# Patient Record
Sex: FEMALE | Race: ASIAN | NOT HISPANIC OR LATINO
[De-identification: names, ages, dates, MRNs, and addresses within clinical notes are randomized per-mention and may not be internally consistent; named-entity substitution may affect disease eponyms.]

---
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
## 2024-02-26 ENCOUNTER — PATIENT MESSAGE (OUTPATIENT)
Dept: INTERNAL MEDICINE CLINIC | Facility: CLINIC | Age: 19
End: 2024-02-26

## 2024-02-26 ENCOUNTER — APPOINTMENT (OUTPATIENT)
Dept: URGENT CARE | Age: 19
End: 2024-02-26

## 2024-02-26 NOTE — TELEPHONE ENCOUNTER
Mom is calling back- Kwabena is home from school until Saturday,  She would like for her to be seen.   No appointments available..  Apt made with Eleni..    Please Triage if needed..

## 2024-02-27 ENCOUNTER — OFFICE VISIT (OUTPATIENT)
Dept: INTERNAL MEDICINE CLINIC | Facility: CLINIC | Age: 19
End: 2024-02-27
Payer: COMMERCIAL

## 2024-02-27 VITALS
SYSTOLIC BLOOD PRESSURE: 100 MMHG | RESPIRATION RATE: 14 BRPM | TEMPERATURE: 98 F | HEART RATE: 88 BPM | BODY MASS INDEX: 19.07 KG/M2 | WEIGHT: 103.63 LBS | OXYGEN SATURATION: 99 % | DIASTOLIC BLOOD PRESSURE: 66 MMHG | HEIGHT: 62 IN

## 2024-02-27 DIAGNOSIS — B37.31 VAGINAL YEAST INFECTION: Primary | ICD-10-CM

## 2024-02-27 DIAGNOSIS — Z23 NEED FOR HPV VACCINATION: ICD-10-CM

## 2024-02-27 PROCEDURE — 3008F BODY MASS INDEX DOCD: CPT | Performed by: FAMILY MEDICINE

## 2024-02-27 PROCEDURE — 3078F DIAST BP <80 MM HG: CPT | Performed by: FAMILY MEDICINE

## 2024-02-27 PROCEDURE — 99213 OFFICE O/P EST LOW 20 MIN: CPT | Performed by: FAMILY MEDICINE

## 2024-02-27 PROCEDURE — 90651 9VHPV VACCINE 2/3 DOSE IM: CPT | Performed by: FAMILY MEDICINE

## 2024-02-27 PROCEDURE — 90471 IMMUNIZATION ADMIN: CPT | Performed by: FAMILY MEDICINE

## 2024-02-27 PROCEDURE — 81514 NFCT DS BV&VAGINITIS DNA ALG: CPT | Performed by: FAMILY MEDICINE

## 2024-02-27 PROCEDURE — 3074F SYST BP LT 130 MM HG: CPT | Performed by: FAMILY MEDICINE

## 2024-02-27 RX ORDER — FLUCONAZOLE 150 MG/1
TABLET ORAL
Qty: 2 TABLET | Refills: 1 | Status: SHIPPED | OUTPATIENT
Start: 2024-02-27

## 2024-02-27 RX ORDER — NYSTATIN 100000 U/G
1 CREAM TOPICAL 3 TIMES DAILY PRN
Qty: 30 G | Refills: 0 | Status: SHIPPED | OUTPATIENT
Start: 2024-02-27

## 2024-02-27 NOTE — PROGRESS NOTES
HPI:   Kwabena Burnett is a 18 year old female   Patient presents with c/o vaginal discharge for the past 14 days. Symptoms started after her last menses. Pt did use a different feminine pad, and a different toilet paper during her menses. LMP  2/10/24, She denies any pelvic pain or dyspareunia. Pt complains of itching.Patient reports the vaginal discharge is white and has no odor. No urinary frequency or urgency. no burning with urination. Exposure to known STD: none, Sexual Hx:  has never been active. Pt is a virgin.OTC treatment includes: coconut oil.    Current Outpatient Medications   Medication Sig Dispense Refill    fluconazole (DIFLUCAN) 150 MG Oral Tab Take one tablet today and then repeat in one week 2 tablet 1    nystatin 100,000 Units/g External Cream Apply 1 Application topically 3 (three) times daily as needed. 30 g 0      No past medical history on file.   No past surgical history on file.   No family history on file.   Social History:   Social History     Socioeconomic History    Marital status: Single   Tobacco Use    Smoking status: Never    Smokeless tobacco: Never   Vaping Use    Vaping Use: Never used   Substance and Sexual Activity    Alcohol use: Never    Drug use: Never   Other Topics Concern    Caffeine Concern No    Exercise Yes    Seat Belt No    Special Diet No    Stress Concern Yes    Weight Concern No        REVIEW OF SYSTEMS:   GENERAL: no fatigue, fever, chills.  SKIN: denies any unusual skin lesions  EYES:denies blurred vision or double vision  HEENT: denies nasal congestion, sinus pain or ST  LUNGS: denies shortness of breath with exertion  CARDIOVASCULAR: denies chest pain on exertion  GI: denies abdominal pain,denies heartburn  : as above  MUSCULOSKELETAL: denies back pain    EXAM:   /66 (BP Location: Left arm, Patient Position: Sitting, Cuff Size: child)   Pulse 88   Temp 98.1 °F (36.7 °C) (Temporal)   Resp 14   Ht 5' 2\" (1.575 m)   Wt 103 lb 9.6 oz (47 kg)   LMP  02/06/2024   SpO2 99%   BMI 18.95 kg/m²   Body mass index is 18.95 kg/m².   GENERAL: well developed, well nourished,in no apparent distress, pleasant pt, under weight.  SKIN: no rashes,no suspicious lesions  LUNGS: clear to auscultation, no W/R/R  CARDIO: RRR without murmur  GI: BS normoactive x 4, soft, NT, no masses, no HSM or CVAT. no suprapubic tenderness  : External genitalia with no lesions or sores . Vaginal vault with white discharge. No speculum was used for exam.  Swab was inserted into vagina to obtain Culture.        ASSESSMENT AND PLAN:   Kwabena Burnett is a 18 year old female who presents with   Encounter Diagnoses   Name Primary?    Need for HPV vaccination Yes    Vaginal yeast infection        Orders Placed This Encounter   Procedures    Human Papillomavirus 9-valent vaccine, Recombinant (Gardasil 9) HPV 9 [66245]    Vaginitis Vaginosis PCR Panel       Meds & Refills for this Visit:  Requested Prescriptions     Signed Prescriptions Disp Refills    fluconazole (DIFLUCAN) 150 MG Oral Tab 2 tablet 1     Sig: Take one tablet today and then repeat in one week    nystatin 100,000 Units/g External Cream 30 g 0     Sig: Apply 1 Application topically 3 (three) times daily as needed.       Imaging & Consults:  HPV HUMAN PAPILLOMA VIRUS VACC 9 RJ 3 DOSE IM    1. Need for HPV vaccination  - Human Papillomavirus 9-valent vaccine, Recombinant (Gardasil 9) HPV 9 [85528]    2. Vaginal yeast infection  - fluconazole (DIFLUCAN) 150 MG Oral Tab; Take one tablet today and then repeat in one week  Dispense: 2 tablet; Refill: 1  - nystatin 100,000 Units/g External Cream; Apply 1 Application topically 3 (three) times daily as needed.  Dispense: 30 g; Refill: 0  - Vaginitis Vaginosis PCR Panel; Future  - Vaginitis Vaginosis PCR Panel  Culture sent.  Pt to start the Diflucan and use the Nystatin cream as needed  Discussed prevention of yeast/bv:   Wear cotton underwear   Change underwear after exercise to keep area as  dry as possible.  Eat yogurt daily to improve balance of natural georgina.  Follow up if symptoms are not improving.

## 2024-02-28 LAB
BV BACTERIA DNA VAG QL NAA+PROBE: NEGATIVE
C GLABRATA DNA VAG QL NAA+PROBE: NEGATIVE
C KRUSEI DNA VAG QL NAA+PROBE: NEGATIVE
CANDIDA DNA VAG QL NAA+PROBE: POSITIVE
T VAGINALIS DNA VAG QL NAA+PROBE: NEGATIVE

## 2024-03-23 ENCOUNTER — PATIENT MESSAGE (OUTPATIENT)
Dept: INTERNAL MEDICINE CLINIC | Facility: CLINIC | Age: 19
End: 2024-03-23

## 2024-03-25 NOTE — TELEPHONE ENCOUNTER
From: Kwabena Burnett  To: Shaina Hudr  Sent: 3/23/2024 3:12 PM CDT  Subject: Yeast Infection    Hello, I recently have visited a nurse a couple weeks ago for a yeast infection that I tested positive for. I was prescribed two fluconazole pills and a nystatin cream. I have used up both pills and have been using the cream every single day. However, I am still experiencing itching and slight discomfort. What can I do to properly treat this?

## 2024-04-03 NOTE — TELEPHONE ENCOUNTER
Spoke to mother Nusrat (full verbal) pt is at [facility] until the end of this month.     Pt did refill the Diflucan and will take 1 pill now and another in 1 week if still not better.     Mother will  the new cream and ship it to daughter at school.

## 2024-12-20 ENCOUNTER — APPOINTMENT (OUTPATIENT)
Dept: URGENT CARE | Age: 19
End: 2024-12-20

## 2024-12-20 ENCOUNTER — V-VISIT (OUTPATIENT)
Dept: URGENT CARE | Age: 19
End: 2024-12-20

## 2024-12-20 VITALS
TEMPERATURE: 98.3 F | DIASTOLIC BLOOD PRESSURE: 78 MMHG | WEIGHT: 103 LBS | SYSTOLIC BLOOD PRESSURE: 117 MMHG | HEART RATE: 91 BPM | BODY MASS INDEX: 18.95 KG/M2 | HEIGHT: 62 IN | OXYGEN SATURATION: 100 % | RESPIRATION RATE: 18 BRPM

## 2024-12-20 DIAGNOSIS — B00.1 COLD SORE: Primary | ICD-10-CM

## 2024-12-20 RX ORDER — VALACYCLOVIR HYDROCHLORIDE 1 G/1
1000 TABLET, FILM COATED ORAL 2 TIMES DAILY
Qty: 14 TABLET | Refills: 0 | Status: SHIPPED | OUTPATIENT
Start: 2024-12-20 | End: 2024-12-27

## 2024-12-20 RX ORDER — MUPIROCIN 20 MG/G
OINTMENT TOPICAL 3 TIMES DAILY
Qty: 22 G | Refills: 1 | Status: SHIPPED | OUTPATIENT
Start: 2024-12-20

## 2024-12-20 ASSESSMENT — PAIN SCALES - GENERAL: PAINLEVEL: 7

## 2024-12-20 ASSESSMENT — ENCOUNTER SYMPTOMS: JAW PAIN: 1

## 2024-12-22 ENCOUNTER — HOSPITAL ENCOUNTER (EMERGENCY)
Age: 19
Discharge: HOME | End: 2024-12-22
Attending: EMERGENCY MEDICINE
Payer: COMMERCIAL

## 2024-12-22 VITALS
TEMPERATURE: 98 F | RESPIRATION RATE: 14 BRPM | HEART RATE: 88 BPM | DIASTOLIC BLOOD PRESSURE: 75 MMHG | SYSTOLIC BLOOD PRESSURE: 125 MMHG | BODY MASS INDEX: 19.32 KG/M2 | HEIGHT: 62 IN | WEIGHT: 105 LBS | OXYGEN SATURATION: 100 %

## 2024-12-22 DIAGNOSIS — K13.0 LIP ABSCESS: Primary | ICD-10-CM

## 2024-12-22 PROCEDURE — 87252 VIRUS INOCULATION TISSUE: CPT | Performed by: EMERGENCY MEDICINE

## 2024-12-22 PROCEDURE — 99284 EMERGENCY DEPT VISIT MOD MDM: CPT

## 2024-12-22 PROCEDURE — 87253 VIRUS INOCULATE TISSUE ADDL: CPT | Performed by: EMERGENCY MEDICINE

## 2024-12-22 PROCEDURE — 10060 I&D ABSCESS SIMPLE/SINGLE: CPT

## 2024-12-22 PROCEDURE — 87186 SC STD MICRODIL/AGAR DIL: CPT | Performed by: EMERGENCY MEDICINE

## 2024-12-22 PROCEDURE — 87070 CULTURE OTHR SPECIMN AEROBIC: CPT | Performed by: EMERGENCY MEDICINE

## 2024-12-22 PROCEDURE — 87205 SMEAR GRAM STAIN: CPT | Performed by: EMERGENCY MEDICINE

## 2024-12-22 RX ORDER — SULFAMETHOXAZOLE AND TRIMETHOPRIM 800; 160 MG/1; MG/1
1 TABLET ORAL 2 TIMES DAILY
Qty: 20 TABLET | Refills: 0 | Status: SHIPPED | OUTPATIENT
Start: 2024-12-22 | End: 2024-12-25

## 2024-12-22 RX ORDER — SULFAMETHOXAZOLE AND TRIMETHOPRIM 800; 160 MG/1; MG/1
1 TABLET ORAL ONCE
Status: COMPLETED | OUTPATIENT
Start: 2024-12-22 | End: 2024-12-22

## 2024-12-23 NOTE — ED INITIAL ASSESSMENT (HPI)
Cold sore starting on Monday to left lower lip, started abreva on Tues - saw [facility] on Friday, given topical antibiotic mupirocin 2%, valacyclovir 1g bid - extensive swelling starting on Wed and left jaw pain   Sent by IC today

## 2024-12-23 NOTE — DISCHARGE INSTRUCTIONS
Apply warm pack to the area 3-4 times a day.  Can apply the mupirocin to the area of the lip that is bleeding.    Take the Bactrim, twice a day, for 10 days.    Take 1000 mg acetaminophen (2 Tylenol tablets) and/or 600 mg ibuprofen (3 Advil tablets) every 6 hours as needed

## 2024-12-23 NOTE — ED PROVIDER NOTES
Patient Seen in: [facility] In [place]      History     Chief Complaint   Patient presents with    Swelling Edema     Stated Complaint: swelling to lower lip     Subjective:     HPI    19-year-old healthy woman who said that she developed a cold sore on her lower lip a week ago and it started to become more and more swollen.  She went to a [facility] clinic 2 days ago and had a virtual visit where she was prescribed mupirocin and valacyclovir.  She went to the [facility] today and was sent to the emergency permit for evaluation.    Objective:   History reviewed. No pertinent past medical history.           History reviewed. No pertinent surgical history.             Social History     Socioeconomic History    Marital status: Single   Tobacco Use    Smoking status: Never    Smokeless tobacco: Never   Vaping Use    Vaping status: Never Used   Substance and Sexual Activity    Alcohol use: Never    Drug use: Never   Other Topics Concern    Caffeine Concern No    Exercise Yes    Seat Belt No    Special Diet No    Stress Concern Yes    Weight Concern No     Social Drivers of Health      [facility]    Housing Stability              Review of Systems    Positive for stated complaint: swelling to lower lip   Other systems are as noted in HPI.  Constitutional and vital signs reviewed.      All other systems reviewed and negative except as noted above.    Physical Exam     ED Triage Vitals [12/22/24 1848]   /75   Pulse 88   Resp 14   Temp 97.8 °F (36.6 °C)   Temp src Temporal   SpO2 100 %   O2 Device None (Room air)       Current:/75   Pulse 88   Temp 97.8 °F (36.6 °C) (Temporal)   Resp 14   Ht 157.5 cm (5' 2\")   Wt 47.6 kg   LMP 12/10/2024 (Approximate)   SpO2 100%   BMI 19.20 kg/m²     General:  Vitals as listed.  No acute distress   HEENT: Sclerae anicteric.  Conjunctivae show no pallor.  Oropharynx  clear, mucous membranes moist.  Swelling and firmness of the lower lip as pictured below.  Small area of purulent drainage at the vermilion border on the red part of the lip.  Lungs: good air exchange   Neuro: Alert oriented and nonfocal        ED COURSE and MDM     Sources of the medical history included the patient and her mother    Procedure:  Area cleansed with Betadine and locally anesthetized with 1% lidocaine and epinephrine.  Most of the pus was removed through the existing drainage site.  I did make a small nick incision to extend the open area adjacent to the vermilion border.  I was able to express all the purulent material.  Patient tolerated the procedure well.    Patient given first dose of Bactrim in the emergency department.  She was prescribed a 10-day course.    I have discussed with the patient the results of testing, differential diagnosis, and treatment plan. They expressed clear understanding of these instructions and agrees to the plan provided.    Disposition and Plan     Clinical Impression:  1. Lip abscess         Disposition:  Discharge  12/22/2024  7:26 pm    Follow-up:  Shaina Gilliland MD  [address] [address]  [phone]    Schedule an appointment as soon as possible for a visit in 2 day(s)  For wound re-check        Medications Prescribed:  Current Discharge Medication List        START taking these medications    Details   sulfamethoxazole-trimethoprim -160 MG Oral Tab per tablet Take 1 tablet by mouth 2 (two) times daily for 10 days.  Qty: 20 tablet, Refills: 0

## 2024-12-25 LAB — HSV CULT WO TYPING: NEGATIVE

## 2024-12-25 RX ORDER — CLINDAMYCIN HYDROCHLORIDE 300 MG/1
300 CAPSULE ORAL 3 TIMES DAILY
Qty: 30 CAPSULE | Refills: 0 | Status: SHIPPED | OUTPATIENT
Start: 2024-12-25 | End: 2025-01-04